# Patient Record
(demographics unavailable — no encounter records)

---

## 2025-07-22 NOTE — PHYSICAL EXAM
[No Acute Distress] : no acute distress [Normal Oropharynx] : normal oropharynx [Normal Appearance] : normal appearance [No Neck Mass] : no neck mass [Normal Rate/Rhythm] : normal rate/rhythm [Normal S1, S2] : normal s1, s2 [No Murmurs] : no murmurs [No Resp Distress] : no resp distress [Clear to Auscultation Bilaterally] : clear to auscultation bilaterally [No Abnormalities] : no abnormalities [Benign] : benign [Normal Gait] : normal gait [No Clubbing] : no clubbing [No Cyanosis] : no cyanosis [No Edema] : no edema [FROM] : FROM [Normal Color/ Pigmentation] : normal color/ pigmentation [No Focal Deficits] : no focal deficits [Oriented x3] : oriented x3 [Normal Affect] : normal affect [TextBox_68] : Decreased breath sounds in the upper lung zones bilaterally

## 2025-07-22 NOTE — HISTORY OF PRESENT ILLNESS
[TextBox_4] :  77-year-old former smoker (50-pack-year, quit 1.5 years ago) female with past medical history of COPD and hypertension presenting for a posthospitalization discharge visit for COPD exacerbation.  She was hospitalized on 6/25/25 - 6/27/25 for COPD exacerbation secondary to presumed viral infection.  She recovered well after prednisone taper and was discharged on Breztri, as she was on Anoro prior to the exacerbation.  She was discharged with the prednisone taper as well as home oxygen as she needed 1 L nasal cannula at discharge.  She is currently off of oxygen but still carries around a portable one in her car that she uses occasionally.  At home she uses nighttime oxygen. She had followed with a prior pulmonologist before from [facility], but wishes to switch to [facility] and is now here to see me to establish care.  Currently has no respiratory issues including dyspnea (both at rest and with exertion), wheezing, coughing, chest pain, etc. Also has no constitutional symptoms including fevers, night sweats, unintentional weight loss.  [ESS] : 0

## 2025-07-22 NOTE — ASSESSMENT
[FreeTextEntry1] : 77-year-old female with above-mentioned history including former smoking and COPD, presenting for posthospitalization discharge visit for her COPD exacerbation.  COPD Former smoker  - PFT from 7/17/2025 was reviewed with patient which overall shows a severe degree of obstruction with an FEV1 of 45%.  Lung volumes are preserved but DLCO is also moderately decreased.  VA is within normal limits so likely due to emphysema with preserved lung volume.  On a 6-minute walk test, she had a significant desaturation, needing 2 L nasal cannula.  She recently saw her former pulmonologist and was recommended pulmonary rehab, which I agree with.  - She had an incidental left lower lobe 6 mm nodule seen on CT scan from 6/2025 during her hospitalization.  Will proceed with a 6-month follow-up and continue annually for lung cancer screening purposes. - She is going to be flying to [place] to visit her son in late 11/2025.  Recommended her to get a high-altitude simulation test mid to late October, to ensure that we are aware of her oxygen need during her flight.  She also needs to get a nocturnal oximetry early October, to see if she continues to need the night time oxygen then - which also has implications for her upcoming trip.  - In the interim, continue with Breztri 2 puffs twice daily as well as albuterol as needed.  I have recommended her to use a nebulizer should she experience worsening of her respiratory symptoms, but otherwise cannot use her albuterol inhaler.  Prednisone rescue kit also prescribed and she will call me if she starts to take it. - Azithromycin TIW to continue. No side effects so far but she stopped her therapy after her hospitalization as she had some questions about reliance and medication side effects.  All of these have been addressed yesterday on a phone conversation and she so far has no side effects.  She is to stop immediately if she starts to experience any including gastrointestinal side effects.  Patient lives in the [facility] and prefers to follow-up around that area.  I have reached out to Dr. Kristal Burgos for further follow-up beyond today.